# Patient Record
Sex: FEMALE | Race: WHITE | URBAN - METROPOLITAN AREA
[De-identification: names, ages, dates, MRNs, and addresses within clinical notes are randomized per-mention and may not be internally consistent; named-entity substitution may affect disease eponyms.]

---

## 2023-05-31 ENCOUNTER — OFFICE (OUTPATIENT)
Dept: URBAN - METROPOLITAN AREA CLINIC 105 | Facility: CLINIC | Age: 18
End: 2023-05-31

## 2023-05-31 VITALS
WEIGHT: 129 LBS | SYSTOLIC BLOOD PRESSURE: 108 MMHG | HEIGHT: 66 IN | DIASTOLIC BLOOD PRESSURE: 65 MMHG | HEART RATE: 80 BPM | BODY MASS INDEX: 20.73 KG/M2 | OXYGEN SATURATION: 98 %

## 2023-05-31 DIAGNOSIS — Z72.0 TOBACCO USE: ICD-10-CM

## 2023-05-31 DIAGNOSIS — R07.9 CHEST PAIN, UNSPECIFIED: ICD-10-CM

## 2023-05-31 DIAGNOSIS — K21.9 GASTRO-ESOPHAGEAL REFLUX DISEASE WITHOUT ESOPHAGITIS: ICD-10-CM

## 2023-05-31 PROCEDURE — 99203 OFFICE O/P NEW LOW 30 MIN: CPT

## 2023-05-31 RX ORDER — PANTOPRAZOLE SODIUM 40 MG/1
TABLET, DELAYED RELEASE ORAL
Qty: 60 | Refills: 1 | Status: ACTIVE
Start: 2023-05-31

## 2023-05-31 NOTE — SERVICEHPINOTES
Dallas Bajwa   is seen for an initial visit today.  
br
brPatient presents for evaluation for GERD and chest pain. Patient states that ever since [employer] she started having heartburn and chest pain. She goes to [employer] and is studying finance. She went to urgent care and was told that she had bronchitis. She had a chest x-ray that was normal. She went to urgent care again due to persistent symptoms and was found to have GERD. She was advised to try over-the-counter Prilosec for 1 month but felt like this made her symptoms worse. She takes Pepcid 1 time per day after dinner with some improvement in her symptoms. She also takes milk of magnesia which helps with her symptoms. She endorses heartburn but denies regurgitation or dysphagia. When she has the symptoms she feels heaviness and difficulty breathing. She reports having normal bowel movements. She denies constipation or diarrhea. She denies blood in stool or black stool. She does vape nicotine. She denies NSAID use. She has never had endoscopy before. She denies family history of colon cancer, celiac disease or inflammatory bowel disease.

## 2023-05-31 NOTE — SERVICENOTES
– Ordered upper endoscopy with Bravo pH testing to be performed at [facility]
– Sent in prescription for pantoprazole 40 mg daily, 30 to 60 minutes before dinner; hold this medication 1 week before your scheduled procedure
– Discussed lifestyle modifications including eating small, frequent meals, elevating head of bed, not eating 3 hours before bedtime and avoiding trigger foods.
– Quit vaping

## 2023-06-30 ENCOUNTER — ON CAMPUS - OUTPATIENT (OUTPATIENT)
Dept: URBAN - METROPOLITAN AREA MEDICAL CENTER 8 | Facility: MEDICAL CENTER | Age: 18
End: 2023-06-30

## 2023-06-30 DIAGNOSIS — K31.89 OTHER DISEASES OF STOMACH AND DUODENUM: ICD-10-CM

## 2023-06-30 DIAGNOSIS — K21.9 GASTRO-ESOPHAGEAL REFLUX DISEASE WITHOUT ESOPHAGITIS: ICD-10-CM

## 2023-06-30 DIAGNOSIS — R10.13 EPIGASTRIC PAIN: ICD-10-CM

## 2023-06-30 DIAGNOSIS — R07.9 CHEST PAIN, UNSPECIFIED: ICD-10-CM

## 2023-06-30 PROCEDURE — 43239 EGD BIOPSY SINGLE/MULTIPLE: CPT
